# Patient Record
Sex: FEMALE | Race: WHITE | Employment: OTHER | URBAN - METROPOLITAN AREA
[De-identification: names, ages, dates, MRNs, and addresses within clinical notes are randomized per-mention and may not be internally consistent; named-entity substitution may affect disease eponyms.]

---

## 2018-03-05 NOTE — PATIENT DISCUSSION
New Prescription: ciprofloxacin (ciprofloxacin): drops: 0.3% 1 drop four times a day as directed into affected eye 03-

## 2018-03-05 NOTE — PATIENT DISCUSSION
Discussed scleral buckle surgery including risks, expected benefits, and alternatives. Reviewed potential for complications including infection, bleeding, retinal tear/detachment, glaucoma, corneal decompensation, change in axial length and refractive error, strabismus, implant exposure, need for additional procedures, and lack of improvement or decrease/loss in vision.

## 2018-03-05 NOTE — PATIENT DISCUSSION
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

## 2018-03-05 NOTE — PATIENT DISCUSSION
New Prescription: prednisolone acetate (prednisolone acetate): drops,suspension: 1% 1 drop as directed into affected eye 03-

## 2018-03-05 NOTE — PATIENT DISCUSSION
New Prescription: atropine (atropine): drops: 1% 1 drop twice a day as directed into affected eye 03-

## 2018-03-05 NOTE — PATIENT DISCUSSION
REVIEWED PVD PRECAUTIONS WITH PATIENT AND ADVISED TO CALL IF EXPERIENCES NEW FLASHES OF LIGHT, INCREASE IN FLOATERS, OR DECREASE VISION IN EITHER EYE.

## 2018-03-07 NOTE — PATIENT DISCUSSION
POST-OP DAY 1 EXAM: s/p SB/PPV/C3F8 for RETINAL DETACHMENT REPAIR OS. Doing well today. Retina attached. IOP within acceptable limits. Start eyedrops in the surgical eye as instructed: Pred 4x/day, Cipro 4x/day, Atropine 2x/day. Take tylenol or ibuprofen for any mild eye pain or pressure. Retinal detachment and endophthalmitis precautions reviewed. Instructed to call immediately for worsening vision, eye pain, or eye discharge.

## 2018-03-07 NOTE — PATIENT DISCUSSION
FACE DOWN -OR- RIGHT SIDE DOWN POSITIONING FOR 1 WEEK. KEEP GAS BRACELET IN PLACE UNTIL BUBBLE GONE.  AVOID AIR TRAVEL, SCUBA DIVING, OR TRAVEL AT ALT[place] UNTIL GAS BUBBLE HAS RESOLVED.

## 2018-03-07 NOTE — PATIENT DISCUSSION
Continue: prednisolone acetate (prednisolone acetate): drops,suspension: 1% 1 drop as directed into affected eye 03-

## 2018-03-07 NOTE — PATIENT DISCUSSION
Continue: ciprofloxacin (ciprofloxacin): drops: 0.3% 1 drop four times a day as directed into affected eye 03-

## 2018-03-15 NOTE — PATIENT DISCUSSION
POST-OP WEEK 1 EXAM: S/P SB/PPV/C3F8 OS for RETINAL DETACHMENT REPAIR OS. Doing well today. Retina attached. IOP within acceptable limits. Continue eyedrops in the surgical eye as instructed. Begin Pred taper. Discontinue Cipro and Atropine. Retinal detachment and endophthalmitis precautions reviewed. Instructed to call immediately for worsening vision, eye pain, or eye discharge.

## 2018-03-15 NOTE — PATIENT DISCUSSION
UPRIGHT / FACE FORWARD WHILE AWAKE. RIGHT SIDE DOWN POSITIONING WHILE SLEEPING UNTIL NEXT VISIT. KEEP GAS BRACELET IN PLACE UNTIL BUBBLE GONE.  AVOID AIR TRAVEL, SCUBA DIVING, OR TRAVEL AT [place]IT[place] UNTIL GAS BUBBLE HAS RESOLVED.

## 2018-04-30 NOTE — PATIENT DISCUSSION
POST-OP MONTH 2 EXAM: s/p SB/PPV/C3F8 for RETINAL DETACHMENT REPAIR OS. Doing well today. Retina attached. IOP within acceptable limits. Finish eyedrops in the surgical eye as instructed. Retinal detachment and endophthalmitis precautions reviewed. Instructed to call immediately for worsening vision, eye pain, or eye discharge.

## 2018-04-30 NOTE — PATIENT DISCUSSION
KEEP GAS BRACELET IN PLACE UNTIL BUBBLE GONE. AVOID AIR TRAVEL, SCUBA DIVING, OR TRAVEL AT [place]IT[place] UNTIL GAS BUBBLE HAS RESOLVED.

## 2018-06-14 NOTE — PATIENT DISCUSSION
POST-OP MONTH 3 EXAM: s/p SB/PPV/C3F8 for RETINAL DETACHMENT REPAIR OS;  Doing well today. Retina attached. IOP within acceptable limits. Finish eyedrops in the surgical eye as instructed. Retinal detachment and endophthalmitis precautions reviewed. Instructed to call immediately for worsening vision, eye pain, or eye discharge.

## 2018-06-14 NOTE — PATIENT DISCUSSION
EPIMACULAR MEMBRANE, OS:  VISUALLY SIGNIFICANT. DISCUSSED SURGICAL OPTIONS. PATIENT ELECTS TO RETURN AS SCHEDULED FOR OCT / EVALUATION / SURGERY PLANNING.

## 2018-08-09 NOTE — PATIENT DISCUSSION
RETINA IS ATTACHED OS: s/p SB/PPV/C3F8 OS;  NO HOLES OR TEARS SEEN ON DILATED EXAM TODAY.  RETINAL DETACHMENT SIGNS AND SYMPTOMS REVIEWED

## 2018-08-09 NOTE — PATIENT DISCUSSION
New Prescription: prednisolone acetate (prednisolone acetate): drops,suspension: 1% 1 drop as directed into affected eye 08-

## 2018-08-09 NOTE — PATIENT DISCUSSION
New Prescription: ciprofloxacin (ciprofloxacin): drops: 0.3% 1 drop four times a day as directed into affected eye 08-

## 2018-08-09 NOTE — PATIENT DISCUSSION
New Prescription: atropine (atropine): drops: 1% 1 drop twice a day as directed into affected eye 08-

## 2018-08-22 NOTE — PATIENT DISCUSSION
POST-OP DAY 1 EXAM:  S/P PPV/MP OS;   Doing well today. Retina attached. IOP within acceptable limits. Start eyedrops in the surgical eye as instructed: Pred 4x/day, Cipro 4x/day, Atropine 2x/day. Take tylenol or ibuprofen for any mild eye pain or pressure. Retinal detachment and endophthalmitis precautions reviewed. Instructed to call immediately for worsening vision, eye pain, or eye discharge.

## 2018-08-22 NOTE — PATIENT DISCUSSION
Continue: prednisolone acetate (prednisolone acetate): drops,suspension: 1% 1 drop as directed into affected eye 08-

## 2018-08-22 NOTE — PATIENT DISCUSSION
Continue: ciprofloxacin (ciprofloxacin): drops: 0.3% 1 drop four times a day as directed into affected eye 08-

## 2018-08-30 NOTE — PATIENT DISCUSSION
Stopped Today: ciprofloxacin (ciprofloxacin): drops: 0.3% 1 drop four times a day as directed into affected eye 08-

## 2018-08-30 NOTE — PATIENT DISCUSSION
POST-OP MONTH 1 EXAM: S/P PPV/MP FOR EPIRETINAL MEMBRANE OS. Doing well today. Retina attached. IOP within acceptable limits. Finish eyedrops in the surgical eye as instructed. Retinal detachment and endophthalmitis precautions reviewed. Instructed to call immediately for worsening vision, eye pain, or eye discharge.

## 2018-11-08 NOTE — PATIENT DISCUSSION
POST-OP MONTH 3 EXAM: s/p PPV/MP FOR EPIRETINAL MEMBRANE OS. Doing well today. Retina attached. IOP within acceptable limits. Finish eyedrops in the surgical eye as instructed. Retinal detachment and endophthalmitis precautions reviewed. Instructed to call immediately for worsening vision, eye pain, or eye discharge.

## 2018-11-08 NOTE — PATIENT DISCUSSION
CLEARED FOR PHACO OS. RECOMMEND CME PREVENTION WITH NSAID EYEDROPS 1 WEEK BEFORE AND 8 WEEKS AFTER SURGERY.

## 2018-12-06 NOTE — PATIENT DISCUSSION
Surgery  Counseling: I have discussed the option of glasses versus cataract surgery versus following. It was explained that when vision no longer meets the patient's visual needs and a new prescription for glasses is not likely to improve the patient's visual symptoms, the option of cataract surgery is a reasonable next step. It was explained that there is no guarantee that removing the cataract will improve their visual symptoms, however; it is believed that the cataract is contributing to the patient's visual impairment and surgery may significantly improve both the visual and functional status of the patient. The risks, benefits and alternatives of surgery were discussed with the patient. After this discussion, the patient desires to proceed with cataract surgery with implantation of an intraocular lens to improve vision for reading small print and street signs.

## 2018-12-06 NOTE — PATIENT DISCUSSION
REFRACTIVE ERROR, OU - DISCUSSED OPTION OF CORRECTING AT THE TIME OF CATARACT SURGERY. PT UNDERSTANDS AND ELECTS TO CONSIDER THEIR OPTIONS. HE IS NOT A CANDIDATE FOR MF/EDOF LENSES S/P RD. HE WOULD LIKE ASTIGMATISM TO BE CORRECTED FOR DISTANCE. HE UNDERSTANDS THAT VISION IN THE LEFT EYE WILL ALWAYS BE LIMITED BY THE RETINA.

## 2018-12-06 NOTE — PATIENT DISCUSSION
Previous Retinal Surgery Counseling: It has been explained that having had prior retinal surgery can limit the visual potential after cataract surgery. Macular distortion may be present which may limit visual acuity. Additionally, there may be higher risk of complication s during cataract surgery. Any questions regarding this condition and how it relates to cataract surgery have been answered.

## 2018-12-06 NOTE — PATIENT DISCUSSION
CATARACT, OU - VISUALLY SIGNIFICANT OS. SCHEDULE SX OS. WILL RE-EVALUATE THE CATARACT IN THE RIGHT EYE ONCE THE LEFT EYE IS STABLE.

## 2018-12-06 NOTE — PATIENT DISCUSSION
ERM OS - CONTINUE TO BE MONITORED BY DR. Karlo Lee. IT WAS EXPLAINED TO THE PATIENT THAT VISION WILL ALWAYS BE LIMITED DUE TO THE ERM.

## 2018-12-06 NOTE — PATIENT DISCUSSION
S/P RD REPAIR - HE WAS CLEARED FOR CATARACT SURGERY. THE PT UNDERSTANDS THAT THE VISION IN THE LEFT EYE WILL NOT EVER BE AS SOTO AS THE VISION IN THE RIGHT EYE. THE PATIENT COMMUNICATES UNDERSTANDING THAT THAT VISUAL POTENTIAL WILL BE LIMITED BECAUSE OF HIS RETINA DETACHMENT.

## 2018-12-07 NOTE — PATIENT DISCUSSION
REFRACTIVE ERROR- OPTS DISC W/ PT. PT UNDERSTANDS AND ELECTS TO PROCEED W/ REFRACTIVE CATARACT SURGERY.  PATIENT PREFERS TO HAVE BEST DISTANCE VISION AND WILL WEAR GLASSES FOR READING/INTERMEDIATE VISION

## 2018-12-17 NOTE — PATIENT DISCUSSION
RETINA IS ATTACHED OS: S/P SCLERAL BUCKLE / PPV/ MEMBRANE PEEL OS; MACULAR THICKENING CONTINUE TO IMPROVE AFTER MEMBRANE PEEL; NO HOLES OR TEARS SEEN ON DILATED EXAM TODAY.  RETINAL DETACHMENT SIGNS AND SYMPTOMS REVIEWED

## 2018-12-20 NOTE — PATIENT DISCUSSION
***This patient had refractive cataract surgery performed. A toric IOL was placed to achieve a target refraction of plano (which should provide them with satisfactory distance vision). ***

## 2019-05-09 NOTE — PATIENT DISCUSSION
MACULAR THICKENING CONTINUES TO IMPROVE AFTER MEMBRANE PEEL SURGERY; RECOMMEND OBSERVATION ONLY AT THIS TIME. RETURN TO DR Leda Aguayo FOR YAG EVAL.

## 2019-05-09 NOTE — PATIENT DISCUSSION
RETINA IS ATTACHED OU: PVD OD; S/P SCLERAL BUCKLE / PPV / MEMBRANE PEEL OS; LATTICE DEGENERATION OU; NO HOLES OR TEARS SEEN ON DILATED EXAM TODAY.  RETINAL DETACHMENT SIGNS AND SYMPTOMS REVIEWED

## 2019-06-17 NOTE — PATIENT DISCUSSION
POSTERIOR CAPSULAR FIBROSIS/OPACIFICATION, OS- VISUALLY SIGNIFICANT. SCHEDULE YAG CAPSULOTOMY OS IF VISUAL SYMPTOMS PERSIST.

## 2019-06-17 NOTE — PATIENT DISCUSSION
Posterior Capsular Fibrosis/Opacification Counseling: I have discussed the option of glasses versus YAG laser surgery versus  following. It was explained that when vision no longer meets the patient's needs, and when a new prescription for glasses is not likely to improve the patient's visual symptoms, the option of YAG laser surgery is a reasonable next step. It was  explained that there is no guarantee that removing the PCF/PCO will improve their visual symptoms. The risks, benefits and alternatives of surgery were discussed with the patient. The uncommon risk of an increase in intraocular pressure or a retinal detachment and their associated symptoms were explained to the patient. After this discussion, the patient desires to proceed with YAG laser to improve vision for playing golf and driving.

## 2019-10-31 NOTE — PATIENT DISCUSSION
RETINA IS ATTACHED OU: PVD OD; S/P SCLERAL BUCKLE / PPV / MEMBRANE PEEL OS; LATTICE DEGENERATION OU; NO NEW HOLES OR TEARS SEEN ON DILATED EXAM TODAY.  RETINAL DETACHMENT SIGNS AND SYMPTOMS REVIEWED

## 2019-12-25 NOTE — PATIENT DISCUSSION
WILL HAVE DR. JAMISON EVALUATE THE RETINA IN THE LEFT EYE PRIOR TO SURGERY AFTER MAC OCT IMAGING TODAY AND CLEAR PRIOR TO SURGERY. negative...

## 2020-11-17 ENCOUNTER — NEW PATIENT COMPREHENSIVE (OUTPATIENT)
Dept: URBAN - METROPOLITAN AREA CLINIC 36 | Facility: CLINIC | Age: 85
End: 2020-11-17

## 2020-11-17 DIAGNOSIS — H52.7: ICD-10-CM

## 2020-11-17 DIAGNOSIS — Z96.1: ICD-10-CM

## 2020-11-17 DIAGNOSIS — H35.3112: ICD-10-CM

## 2020-11-17 DIAGNOSIS — H35.3123: ICD-10-CM

## 2020-11-17 PROCEDURE — 92004 COMPRE OPH EXAM NEW PT 1/>: CPT

## 2020-11-17 PROCEDURE — 92015 DETERMINE REFRACTIVE STATE: CPT

## 2020-11-17 ASSESSMENT — VISUAL ACUITY
OS_CC: J12
OS_SC: >J12
OD_SC: 20/50-2
OS_SC: 20/100-2
OD_SC: J12
OD_CC: J8
OS_CC: 20/100
OD_CC: 20/40

## 2020-11-17 ASSESSMENT — TONOMETRY
OD_IOP_MMHG: 14
OS_IOP_MMHG: 14

## 2021-05-25 ENCOUNTER — DILATED FUNDUS EXAM (OUTPATIENT)
Dept: URBAN - METROPOLITAN AREA CLINIC 36 | Facility: CLINIC | Age: 86
End: 2021-05-25

## 2021-05-25 DIAGNOSIS — H35.3123: ICD-10-CM

## 2021-05-25 DIAGNOSIS — H35.3112: ICD-10-CM

## 2021-05-25 DIAGNOSIS — Z96.1: ICD-10-CM

## 2021-05-25 PROCEDURE — 92014 COMPRE OPH EXAM EST PT 1/>: CPT

## 2021-05-25 ASSESSMENT — VISUAL ACUITY
OS_PH: 20/400
OD_CC: 20/40-2
OS_CC: CF 4FT

## 2021-05-25 ASSESSMENT — TONOMETRY
OD_IOP_MMHG: 14
OS_IOP_MMHG: 14

## 2021-10-22 ENCOUNTER — TECH ONLY (OUTPATIENT)
Dept: URBAN - METROPOLITAN AREA CLINIC 36 | Facility: CLINIC | Age: 86
End: 2021-10-22

## 2021-10-22 DIAGNOSIS — H35.3112: ICD-10-CM

## 2021-10-22 DIAGNOSIS — H35.3123: ICD-10-CM

## 2021-10-22 PROCEDURE — 99211T TECH SERVICE

## 2021-10-22 PROCEDURE — 92134 CPTRZ OPH DX IMG PST SGM RTA: CPT

## 2021-10-27 ENCOUNTER — ESTABLISHED COMPREHENSIVE EXAM (OUTPATIENT)
Dept: URBAN - METROPOLITAN AREA CLINIC 36 | Facility: CLINIC | Age: 86
End: 2021-10-27

## 2021-10-27 DIAGNOSIS — Z96.1: ICD-10-CM

## 2021-10-27 DIAGNOSIS — H35.3123: ICD-10-CM

## 2021-10-27 DIAGNOSIS — H35.3112: ICD-10-CM

## 2021-10-27 DIAGNOSIS — H52.7: ICD-10-CM

## 2021-10-27 PROCEDURE — 92015 DETERMINE REFRACTIVE STATE: CPT

## 2021-10-27 PROCEDURE — 92014 COMPRE OPH EXAM EST PT 1/>: CPT

## 2021-10-27 ASSESSMENT — VISUAL ACUITY
OS_SC: CF 3FT
OS_CC: J>10
OS_SC: J>10
OD_CC: J3
OD_SC: 20/60-2
OD_SC: J8
OS_CC: 20/>400
OD_CC: 20/60-2

## 2021-10-27 ASSESSMENT — TONOMETRY
OS_IOP_MMHG: 14
OD_IOP_MMHG: 14

## 2022-05-24 ENCOUNTER — ESTABLISHED PATIENT (OUTPATIENT)
Dept: URBAN - METROPOLITAN AREA CLINIC 36 | Facility: CLINIC | Age: 87
End: 2022-05-24

## 2022-05-24 DIAGNOSIS — Z96.1: ICD-10-CM

## 2022-05-24 DIAGNOSIS — H35.3123: ICD-10-CM

## 2022-05-24 DIAGNOSIS — H35.3112: ICD-10-CM

## 2022-05-24 PROCEDURE — 92014 COMPRE OPH EXAM EST PT 1/>: CPT

## 2022-05-24 PROCEDURE — 92134 CPTRZ OPH DX IMG PST SGM RTA: CPT

## 2022-05-24 ASSESSMENT — VISUAL ACUITY
OU_CC: J3
OD_PH: 20/50
OD_CC: J3
OD_CC: 20/60-2
OS_CC: CF 4FT
OU_CC: 20/40-1
OS_CC: J12-1

## 2022-05-24 ASSESSMENT — TONOMETRY
OS_IOP_MMHG: 13
OD_IOP_MMHG: 14

## 2022-11-23 ENCOUNTER — COMPREHENSIVE EXAM (OUTPATIENT)
Dept: URBAN - METROPOLITAN AREA CLINIC 36 | Facility: CLINIC | Age: 87
End: 2022-11-23

## 2022-11-23 DIAGNOSIS — H52.7: ICD-10-CM

## 2022-11-23 DIAGNOSIS — H35.3112: ICD-10-CM

## 2022-11-23 DIAGNOSIS — Z96.1: ICD-10-CM

## 2022-11-23 DIAGNOSIS — H35.3123: ICD-10-CM

## 2022-11-23 PROCEDURE — 92015 DETERMINE REFRACTIVE STATE: CPT

## 2022-11-23 PROCEDURE — 92014 COMPRE OPH EXAM EST PT 1/>: CPT

## 2022-11-23 PROCEDURE — 92134 CPTRZ OPH DX IMG PST SGM RTA: CPT

## 2022-11-23 ASSESSMENT — VISUAL ACUITY
OD_SC: J8
OD_PH: 20/40-1
OS_SC: CF 4FT
OS_CC: >J10
OD_CC: 20/50+2
OS_CC: CF 5FT
OS_SC: >J10
OD_SC: 20/50-2
OD_CC: J3

## 2022-11-23 ASSESSMENT — TONOMETRY
OS_IOP_MMHG: 17
OD_IOP_MMHG: 18

## 2023-05-25 ENCOUNTER — ESTABLISHED PATIENT (OUTPATIENT)
Dept: URBAN - METROPOLITAN AREA CLINIC 36 | Facility: CLINIC | Age: 88
End: 2023-05-25

## 2023-05-25 DIAGNOSIS — H35.3123: ICD-10-CM

## 2023-05-25 DIAGNOSIS — H35.3112: ICD-10-CM

## 2023-05-25 PROCEDURE — 92134 CPTRZ OPH DX IMG PST SGM RTA: CPT

## 2023-05-25 PROCEDURE — 92014 COMPRE OPH EXAM EST PT 1/>: CPT

## 2023-05-25 ASSESSMENT — VISUAL ACUITY
OD_SC: >J10
OS_SC: >J10
OD_PH: 20/50+2
OD_SC: 20/70-2
OS_CC: >J10
OS_SC: CF 5FT
OS_CC: CF 4FT
OD_CC: 20/60+2
OD_CC: J5

## 2023-05-25 ASSESSMENT — TONOMETRY
OS_IOP_MMHG: 14
OD_IOP_MMHG: 14

## 2023-06-06 ENCOUNTER — CONSULTATION/EVALUATION (OUTPATIENT)
Dept: URBAN - METROPOLITAN AREA CLINIC 46 | Facility: CLINIC | Age: 88
End: 2023-06-06

## 2023-06-06 DIAGNOSIS — H04.123: ICD-10-CM

## 2023-06-06 DIAGNOSIS — H35.3112: ICD-10-CM

## 2023-06-06 DIAGNOSIS — H35.3221: ICD-10-CM

## 2023-06-06 DIAGNOSIS — H35.30: ICD-10-CM

## 2023-06-06 DIAGNOSIS — H35.363: ICD-10-CM

## 2023-06-06 PROCEDURE — 99214 OFFICE O/P EST MOD 30 MIN: CPT

## 2023-06-06 PROCEDURE — 67028 INJECTION EYE DRUG: CPT

## 2023-06-06 PROCEDURE — 92250 FUNDUS PHOTOGRAPHY W/I&R: CPT

## 2023-06-06 ASSESSMENT — VISUAL ACUITY
OD_CC: 20/30-1
OS_CC: CF 5FT

## 2023-06-06 ASSESSMENT — TONOMETRY
OS_IOP_MMHG: 14
OD_IOP_MMHG: 14

## 2023-07-18 ENCOUNTER — CLINIC PROCEDURE ONLY (OUTPATIENT)
Dept: URBAN - METROPOLITAN AREA CLINIC 46 | Facility: CLINIC | Age: 88
End: 2023-07-18

## 2023-07-18 DIAGNOSIS — H35.3221: ICD-10-CM

## 2023-07-18 DIAGNOSIS — H35.732: ICD-10-CM

## 2023-07-18 PROCEDURE — 92250 FUNDUS PHOTOGRAPHY W/I&R: CPT

## 2023-07-18 PROCEDURE — 67028 INJECTION EYE DRUG: CPT

## 2023-07-18 ASSESSMENT — VISUAL ACUITY
OS_CC: CF 5FT
OD_CC: 20/40+1

## 2023-07-18 ASSESSMENT — TONOMETRY
OD_IOP_MMHG: 13
OS_IOP_MMHG: 14

## 2023-09-05 ENCOUNTER — ESTABLISHED PATIENT (OUTPATIENT)
Dept: URBAN - METROPOLITAN AREA CLINIC 46 | Facility: CLINIC | Age: 88
End: 2023-09-05

## 2023-09-05 DIAGNOSIS — H35.721: ICD-10-CM

## 2023-09-05 DIAGNOSIS — H35.3221: ICD-10-CM

## 2023-09-05 DIAGNOSIS — H35.30: ICD-10-CM

## 2023-09-05 DIAGNOSIS — H35.3112: ICD-10-CM

## 2023-09-05 DIAGNOSIS — H35.732: ICD-10-CM

## 2023-09-05 DIAGNOSIS — H43.813: ICD-10-CM

## 2023-09-05 DIAGNOSIS — H04.123: ICD-10-CM

## 2023-09-05 DIAGNOSIS — H35.09: ICD-10-CM

## 2023-09-05 DIAGNOSIS — H35.033: ICD-10-CM

## 2023-09-05 DIAGNOSIS — H35.363: ICD-10-CM

## 2023-09-05 PROCEDURE — 99213 OFFICE O/P EST LOW 20 MIN: CPT

## 2023-09-05 PROCEDURE — 92250 FUNDUS PHOTOGRAPHY W/I&R: CPT

## 2023-09-05 PROCEDURE — 67028 INJECTION EYE DRUG: CPT

## 2023-09-05 ASSESSMENT — TONOMETRY
OS_IOP_MMHG: 12
OD_IOP_MMHG: 11

## 2023-09-05 ASSESSMENT — VISUAL ACUITY
OS_CC: CF 4FT
OD_CC: 20/50+1

## 2023-10-17 ENCOUNTER — ESTABLISHED PATIENT (OUTPATIENT)
Dept: URBAN - METROPOLITAN AREA CLINIC 46 | Facility: CLINIC | Age: 88
End: 2023-10-17

## 2023-10-17 DIAGNOSIS — H35.723: ICD-10-CM

## 2023-10-17 DIAGNOSIS — H35.09: ICD-10-CM

## 2023-10-17 DIAGNOSIS — H35.30: ICD-10-CM

## 2023-10-17 DIAGNOSIS — H43.813: ICD-10-CM

## 2023-10-17 DIAGNOSIS — H35.363: ICD-10-CM

## 2023-10-17 DIAGNOSIS — H35.033: ICD-10-CM

## 2023-10-17 DIAGNOSIS — H04.123: ICD-10-CM

## 2023-10-17 DIAGNOSIS — H35.3134: ICD-10-CM

## 2023-10-17 PROCEDURE — 92250 FUNDUS PHOTOGRAPHY W/I&R: CPT

## 2023-10-17 PROCEDURE — 99214 OFFICE O/P EST MOD 30 MIN: CPT | Mod: 25

## 2023-10-17 PROCEDURE — 67028 INJECTION EYE DRUG: CPT | Mod: 50,LT

## 2023-10-17 PROCEDURE — 67028 INJECTION EYE DRUG: CPT | Mod: 50,RT

## 2023-10-17 PROCEDURE — J3490IZ IZERVAY 2MG: Mod: JZ,RT

## 2023-10-17 PROCEDURE — J3490IZ IZERVAY 2MG: Mod: JZ,LT

## 2023-10-17 ASSESSMENT — TONOMETRY
OD_IOP_MMHG: 14
OS_IOP_MMHG: 16

## 2023-10-17 ASSESSMENT — VISUAL ACUITY
OD_SC: 20/50
OS_SC: CF 3FT
